# Patient Record
Sex: MALE | Race: WHITE | NOT HISPANIC OR LATINO | Employment: FULL TIME | URBAN - METROPOLITAN AREA
[De-identification: names, ages, dates, MRNs, and addresses within clinical notes are randomized per-mention and may not be internally consistent; named-entity substitution may affect disease eponyms.]

---

## 2017-10-31 ENCOUNTER — APPOINTMENT (OUTPATIENT)
Dept: URGENT CARE | Facility: CLINIC | Age: 27
End: 2017-10-31
Payer: COMMERCIAL

## 2017-10-31 ENCOUNTER — APPOINTMENT (OUTPATIENT)
Dept: RADIOLOGY | Facility: IMAGING CENTER | Age: 27
End: 2017-10-31
Attending: NURSE PRACTITIONER
Payer: COMMERCIAL

## 2017-10-31 ENCOUNTER — OCCUPATIONAL MEDICINE (OUTPATIENT)
Dept: URGENT CARE | Facility: CLINIC | Age: 27
End: 2017-10-31
Payer: COMMERCIAL

## 2017-10-31 VITALS
RESPIRATION RATE: 20 BRPM | SYSTOLIC BLOOD PRESSURE: 110 MMHG | HEART RATE: 73 BPM | OXYGEN SATURATION: 99 % | DIASTOLIC BLOOD PRESSURE: 80 MMHG | WEIGHT: 170 LBS | TEMPERATURE: 97.5 F

## 2017-10-31 DIAGNOSIS — S69.91XA HAND INJURY, RIGHT, INITIAL ENCOUNTER: ICD-10-CM

## 2017-10-31 DIAGNOSIS — S61.411A LACERATION OF RIGHT HAND WITHOUT FOREIGN BODY, INITIAL ENCOUNTER: ICD-10-CM

## 2017-10-31 PROCEDURE — 12041 INTMD RPR N-HF/GENIT 2.5CM/<: CPT | Performed by: NURSE PRACTITIONER

## 2017-10-31 PROCEDURE — 90715 TDAP VACCINE 7 YRS/> IM: CPT | Performed by: NURSE PRACTITIONER

## 2017-10-31 PROCEDURE — 90471 IMMUNIZATION ADMIN: CPT | Performed by: NURSE PRACTITIONER

## 2017-10-31 PROCEDURE — 73130 X-RAY EXAM OF HAND: CPT | Mod: TC,RT | Performed by: NURSE PRACTITIONER

## 2017-10-31 RX ORDER — CEPHALEXIN 500 MG/1
500 CAPSULE ORAL 4 TIMES DAILY
Qty: 20 CAP | Refills: 0 | Status: SHIPPED | OUTPATIENT
Start: 2017-10-31 | End: 2017-11-05

## 2017-10-31 ASSESSMENT — ENCOUNTER SYMPTOMS
TINGLING: 0
NECK PAIN: 0
FALLS: 0
MYALGIAS: 0
BACK PAIN: 0
ROS SKIN COMMENTS: LACERATION
NEUROLOGICAL NEGATIVE: 1
SENSORY CHANGE: 0
CONSTITUTIONAL NEGATIVE: 1
CARDIOVASCULAR NEGATIVE: 1
GASTROINTESTINAL NEGATIVE: 1
FOCAL WEAKNESS: 0
PSYCHIATRIC NEGATIVE: 1
RESPIRATORY NEGATIVE: 1

## 2017-10-31 NOTE — PROGRESS NOTES
Subjective:      Benny Obrien is a 27 y.o. male who presents with Laceration (WC RT hand)      HPI    DOI 10/31/17: Patient reports he was lifting a heavy couch, while at work, approx one hour ago when the couch dropped and his right hand hit a piece of metal, resulting in a laceration to his right hand. He came immediately to urgent care. He has not done anything for symptom relief. He denies numbness, tingling, or weakness. He is right hand dominate. He has injured this hand in the past but not in this particular area. He cannot recall his last tetanus booster. Denies other areas of injury. He denies other jobs or contributing factors.     History reviewed. No pertinent past medical history.  History reviewed. No pertinent surgical history.  No current outpatient prescriptions on file prior to visit.     No current facility-administered medications on file prior to visit.      Review of patient's allergies indicates no known allergies.    Review of Systems   Constitutional: Negative.    HENT: Negative.    Respiratory: Negative.    Cardiovascular: Negative.    Gastrointestinal: Negative.    Musculoskeletal: Positive for joint pain. Negative for back pain, falls, myalgias and neck pain.   Skin: Negative for itching and rash.        Laceration    Neurological: Negative.  Negative for tingling, sensory change and focal weakness.   Psychiatric/Behavioral: Negative.           Objective:     /80   Pulse 73   Temp 36.4 °C (97.5 °F)   Resp 20   Wt 77.1 kg (170 lb) Comment: per pt  SpO2 99%      Physical Exam   Constitutional: He is oriented to person, place, and time. Vital signs are normal. He appears well-developed and well-nourished. He is active. He does not appear ill. No distress.   HENT:   Head: Normocephalic and atraumatic.   Right Ear: External ear normal.   Left Ear: External ear normal.   Nose: Nose normal.   Mouth/Throat: Oropharynx is clear and moist.   Eyes: Conjunctivae are normal. Pupils  are equal, round, and reactive to light. Right eye exhibits no discharge. Left eye exhibits no discharge. No scleral icterus.   Neck: Normal range of motion. Neck supple.   Cardiovascular: Normal rate, regular rhythm, normal heart sounds and intact distal pulses.    Pulmonary/Chest: Effort normal and breath sounds normal. No respiratory distress.   Musculoskeletal: Normal range of motion. He exhibits tenderness. He exhibits no edema or deformity.        Right hand: He exhibits tenderness, bony tenderness and laceration. He exhibits normal range of motion, normal two-point discrimination, normal capillary refill, no deformity and no swelling. Normal sensation noted. Normal strength noted.        Hands:  There is a laceration and point tenderness to distal and dorsal aspect of his right third metacarpal. The laceration is approximately 1.5 cm, crescent shaped. No obvious foreign bodies identified. No tendon or ligament involvement, no bony exposure. He has full ROM. Distal N/V intact. Cap refill is brisk.     Lymphadenopathy:     He has no cervical adenopathy.   Neurological: He is alert and oriented to person, place, and time. He has normal strength. No cranial nerve deficit or sensory deficit. He exhibits normal muscle tone. Coordination and gait normal. GCS eye subscore is 4. GCS verbal subscore is 5. GCS motor subscore is 6.   Skin: Skin is warm. Capillary refill takes less than 2 seconds. Laceration noted. No abrasion, no bruising, no ecchymosis and no rash noted. He is not diaphoretic. No erythema. No pallor.   Psychiatric: He has a normal mood and affect. His behavior is normal. Judgment and thought content normal.   Vitals reviewed.         Assessment/Plan:     1. Laceration of right hand without foreign body, initial encounter  DX-HAND 3+ RIGHT    Tdap =>6yo IM    cephALEXin (KEFLEX) 500 MG Cap   2. Hand injury, right, initial encounter  DX-HAND 3+ RIGHT    cephALEXin (KEFLEX) 500 MG Cap       - DX-HAND 3+  "RIGHT reviewed by myself, radiology reading \"Unremarkable right hand series.\"      Procedure: Laceration Repair  -Risks including bleeding, nerve damage, infection, and poor cosmetic outcome discussed at length. Benefits and alternatives discussed.   -Sterile technique throughout  -Local anesthesia with 2% lidocaine  -Closed with #5  4-0 and 5-0 Nylon interrupted sutures with good wound approximation, full ROM and N/V intact post closure  -Polysporin and dressing placed  -Patient tolerated well        TDAP in clinic, laceration repair and dressing applied, Keflex, RICE, OTC motrin/tylenol for pain, RTC 11/2/17 for re-eval. Wound care discussed. Case reviewed with Dr. Michelle who evaluated the patient with me, agrees to place patient on prophylactic antibiotics.   Supportive care, differential diagnoses, and indications for immediate follow-up discussed with patient.   Pathogenesis of diagnosis discussed including typical length and natural progression.   Instructed to return to clinic or nearest emergency department sooner for any change in condition, further concerns, or worsening of symptoms.  Patient states understanding of the plan of care and discharge instructions.          JOSE Love.            "

## 2017-10-31 NOTE — LETTER
EMPLOYEE’S CLAIM FOR COMPENSATION/ REPORT OF INITIAL TREATMENT  FORM C-4    EMPLOYEE’S CLAIM - PROVIDE ALL INFORMATION REQUESTED   First Name  Benny Last Name  Camille Birthdate                    1990                Sex  male Claim Number   Home Address  [address] Age  27 y.o. Height   Weight  77.1 kg (170 lb) [facility]     [place] Zip  [address] Telephone  [phone] (home)    Mailing Address  [address] [place] Zip  [address] Primary Language Spoken  English    Insurer   Third Party   [employer]   Employee's Occupation (Job Title) When Injury or Occupational Disease Occurred      Employer's Name     Telephone      Employer Address    City    State    Zip      Date of Injury  10/31/2017               Hour of Injury  8:45 AM Date Employer Notified  10/31/2017 Last Day of Work after Injury or Occupational Disease  10/31/2017 Supervisor to Whom Injury Reported  Eduardo Macdonald   Address or Location of Accident (if applicable)  [[place]]   What were you doing at the time of accident? (if applicable)  Moving Cough    How did this injury or occupational disease occur? (Be specific an answer in detail. Use additional sheet if necessary)  I was carrying one side of couch. I ran my hand against a peice of metal   If you believe that you have an occupational disease, when did you first have knowledge of the disability and it relationship to your employment?  na Witnesses to the Accident  Josse Herrera      Nature of Injury or Occupational Disease  Laceration  Part(s) of Body Injured or Affected  Hand (R), N/A, N/A    I certify that the above is true and correct to the best of my knowledge and that I have provided this information in order to obtain the benefits of [place]  I  hereby authorize any physician, chiropractor, surgeon, practitioner, or other person, any hospital, including [facility] or [facility], any medical service organization, any insurance company, or other institution or organization to release to each other, any medical or other information, including benefits paid or payable, pertinent to this injury or disease, except information relative to diagnosis, treatment and/or counseling for AIDS, psychological conditions, alcohol or controlled substances, for which I must give specific authorization.  A Photostat of this authorization shall be as valid as the original.     Date   Place   Employee’s Signature   THIS REPORT MUST BE COMPLETED AND MAILED WITHIN 3 WORKING DAYS OF TREATMENT   Place  [facility]  Name of Facility  [facility]   Date  10/31/2017 Diagnosis  (S61.411A) Laceration of right hand without foreign body, initial encounter  (S69.91XA) Hand injury, right, initial encounter Is there evidence the injured employee was under the influence of alcohol and/or another controlled substance at the time of accident?   Hour  10:00 AM Description of Injury or Disease  Diagnoses of Laceration of right hand without foreign body, initial encounter and Hand injury, right, initial encounter were pertinent to this visit. No   Treatment  TDAP in clinic, laceration repair and dressing applied, Keflex, RICE, OTC motrin/tylenol for pain, RTC 11/2/17 for re-eval.   Have you advised the patient to remain off work five days or more? No   X-Ray Findings  Negative   If Yes   From Date  To Date      From information given by the employee, together with medical evidence, can you directly connect this injury or occupational disease as job incurred?  Yes If No Full Duty  No Modified Duty  Yes   Is additional medical care by a physician indicated?  Yes If Modified Duty, Specify any Limitations / Restrictions  Per D-39   Do you know of any previous injury  "or disease contributing to this condition or occupational disease?                            No   Date  10/31/2017 Print Doctor’s Name JODI Love I certify the employer’s copy of  this form was mailed on:   Address  [address] Insurer’s Use Only     [place] Zip  [address]    Provider’s Tax ID Number  [id number] Telephone  Dept: [phone]        e-RAJWINDER Leon   e-Signature: Dr. Vick Becerra, Medical Director Degree  APRN        ORIGINAL-TREATING PHYSICIAN OR CHIROPRACTOR    PAGE 2-INSURER/TPA    PAGE 3-EMPLOYER    PAGE 4-EMPLOYEE             Form C-4 (rev10/07)              BRIEF DESCRIPTION OF RIGHTS AND BENEFITS  (Pursuant to [place] 616C.050)    Notice of Injury or Occupational Disease (Incident Report Form C-1): If an injury or occupational disease (OD) arises out of and in the  course of employment, you must provide written notice to your employer as soon as practicable, but no later than 7 days after the accident or  OD. Your employer shall maintain a sufficient supply of the required forms.    Claim for Compensation (Form C-4): If medical treatment is sought, the form C-4 is available at the place of initial treatment. A completed  \"Claim for Compensation\" (Form C-4) must be filed within 90 days after an accident or OD. The treating physician or chiropractor must,  within 3 working days after treatment, complete and mail to the employer, the employer's insurer and third-party , the Claim for  Compensation.    Medical Treatment: If you require medical treatment for your on-the-job injury or OD, you may be required to select a physician or  chiropractor from a list provided by your workers’ compensation insurer, if it has contracted with an Organization for Managed Care (MCO) or  Preferred Provider Organization (PPO) or providers of health care. If your employer has not entered into a contract with an MCO or PPO, you  may select a physician or " chiropractor from the Panel of Physicians and Chiropractors. Any medical costs related to your industrial injury or  OD will be paid by your insurer.    Temporary Total Disability (TTD): If your doctor has certified that you are unable to work for a period of at least 5 consecutive days, or 5  cumulative days in a 20-day period, or places restrictions on you that your employer does not accommodate, you may be entitled to TTD  compensation.    Temporary Partial Disability (TPD): If the wage you receive upon reemployment is less than the compensation for TTD to which you are  entitled, the insurer may be required to pay you TPD compensation to make up the difference. TPD can only be paid for a maximum of 24  months.    Permanent Partial Disability (PPD): When your medical condition is stable and there is an indication of a PPD as a result of your injury or  OD, within 30 days, your insurer must arrange for an evaluation by a rating physician or chiropractor to determine the degree of your PPD. The  amount of your PPD award depends on the date of injury, the results of the PPD evaluation and your age and wage.    Permanent Total Disability (PTD): If you are medically certified by a treating physician or chiropractor as permanently and totally disabled  and have been granted a PTD status by your insurer, you are entitled to receive monthly benefits not to exceed 66 2/3% of your average  monthly wage. The amount of your PTD payments is subject to reduction if you previously received a PPD award.    Vocational Rehabilitation Services: You may be eligible for vocational rehabilitation services if you are unable to return to the job due to a  permanent physical impairment or permanent restrictions as a result of your injury or occupational disease.    Transportation and Per Frances Reimbursement: You may be eligible for travel expenses and per frances associated with medical treatment.    Reopening: You may be able to reopen your  claim if your condition worsens after claim closure.    Appeal Process: If you disagree with a written determination issued by the insurer or the insurer does not respond to your request, you may  appeal to the Department of Administration, , by following the instructions contained in your determination letter. You must  appeal the determination within 70 days from the date of the determination letter at [address], [address], [place], [place]  [address], or [address], [place], [place] [address]. If you disagree with the  decision, you may appeal to the  Department of Administration, . You must file your appeal within 30 days from the date of the  decision  letter at [address], [address], [place], [place] [address], or [address], [place], [place] [address]. If you  disagree with a decision of an , you may file a petition for judicial review with the District Court. You must do so within 30  days of the Appeal Officer’s decision. You may be represented by an  at your own expense or you may contact the [facility] for possible  representation.    [place]  for Injured Workers (NAIW): If you disagree with a  decision, you may request that NAIW represent you  without charge at an  Hearing. For information regarding denial of benefits, you may contact the [facility] at: [address], [address], [place] [address], [phone]) [phone], or [address], [place] [address], [phone]) [phone]    To File a Complaint with the Division: If you wish to file a complaint with the  of the Division of Industrial Relations (DIR),  please contact the Workers’ Compensation Section, [address], [address], [place], [place] [address], telephone ([phone]) [phone], or  [address], [place]  [address], telephone [phone]) [phone].    For assistance with Workers’ Compensation Issues: you may contact the Office of the Governor Consumer Health Assistance, [address] [place] [address], [address], [place] [address], Toll Free 1-583.820.2016, Web site: http://[employer].[facility].nv., E-mail  [email or web address].                                                                                                                                                                                                                                   __________________________________________________________________                                                                   _________________                Employee Name / Signature                                                                                                                                                       Date                                                                                                                                                                                                     D-2 (rev. 10/07)

## 2017-10-31 NOTE — LETTER
"   [facility]  [address] [place] [address]  Phone:  [phone] - Fax:  [phone]   [facility] Progress Report and Disability Certification  Date of Service: 10/31/2017   No Show:  No  Date / Time of Next Visit: 11/2/2017   Claim Information   Patient Name: Benny Obrien  Claim Number:     Employer:   Date of Injury: 10/31/2017     Insurer / TPA: Donna  ID / SSN:     Occupation:   Diagnosis: Diagnoses of Laceration of right hand without foreign body, initial encounter and Hand injury, right, initial encounter were pertinent to this visit.    Medical Information   Related to Industrial Injury? Yes    Subjective Complaints:  DOI 10/31/17: Patient reports he was lifting a heavy couch, while at work, approx one hour ago when the couch dropped and his right hand hit a piece of metal, resulting in a laceration to his right hand. He came immediately to urgent care. He has not done anything for symptom relief. He denies numbness, tingling, or weakness. He is right hand dominate. He has injured this hand in the past but not in this particular area. He cannot recall his last tetanus booster. Denies other areas of injury. He denies other jobs or contributing factors.    Objective Findings: A/Ox4. NAD. There is a laceration and point tenderness to distal and dorsal aspect of his right third metacarpal. The laceration is approximately 1.5 cm, crescent shaped. No obvious foreign bodies identified. No tendon or ligament involvement, no bony exposure. He has full ROM. Distal N/V intact. Cap refill is brisk.     Pre-Existing Condition(s):    Assessment:   Initial Visit    Status: Additional Care Required  Permanent Disability:No    Plan: Medication  Comments:TDAP in clinic, laceration repair and dressing applied, Keflex, RICE, OTC motrin/tylenol for pain, RTC 11/2/17 for re-eval.     Diagnostics: X-ray  Comments:DX right hand radiology reading \"Unremarkable " "right hand series\"    Comments:       Disability Information   Status: Released to Restricted Duty    From:  10/31/2017  Through: 2017 Restrictions are: Temporary   Physical Restrictions   Sitting:    Standing:    Stooping:    Bending:      Squatting:    Walking:    Climbing:    Pushing:  < or = to 1 hr/day  Comments:right hand   Pulling:  < or = to 1 hr/day  Comments:right hand Other:    Reaching Above Shoulder (L):   Reaching Above Shoulder (R):       Reaching Below Shoulder (L):    Reaching Below Shoulder (R):      Not to exceed Weight Limits   Carrying(hrs): 0  Comments:right hand Weight Limit(lb):   Lifting(hrs): 0  Comments:right hand Weight  Limit(lb):     Comments: Patient has been instructed to keep hand clean and dry, with dressing in place, while at work. Not to submerge into water or liquids. Consulted with Dr. Michelle regarding patient, he advised to place patient on Keflex.     Repetitive Actions   Hands: i.e. Fine Manipulations from [place] hrs/day  Comments:right hand   Feet: i.e. Operating Foot Controls:     Driving / Operate Machinery:     Physician Name: JODI Love Physician Signature: RAJWINDER Chacon e-Signature: Dr. Vick Becerra, Medical Director   Clinic Name / Location: [facility]  [address] [place] [address] Clinic Phone Number: Dept: [phone]   Appointment Time: 10:00 Am Visit Start Time: 10:00 AM   Check-In Time:  9:57 Am Visit Discharge Time:     Original-Treating Physician or Chiropractor    Page 2-Insurer/TPA    Page 3-Employer    Page 4-Employee    "

## 2017-10-31 NOTE — LETTER
[facility]  DOCTOR'S FIRST REPORT OF OCCUPATIONAL INJURY OR ILLNESS  Within 5 days of your initial examination, for every occupational injury or illness, send two copies of this report to the employer's workers' compensation insurance carrier or the insured employer. Failure to file a timely doctor's report may result in assessment of a civil penalty. In the case of diagnosed or suspected pesticide poisoning, send a copy of the report to Division of Labor Statistics and Research, [address], [place] [address], and notify your local health officer by telephone within 24 hours.     1. INSURER NAME AND ADDRESS  Esis PLEASE DO NOT USE THIS COLUMN     2. EMPLOYER NAME         Case No.     3. Address          No. And [facility]   [employer]     4. Nature of Business (e.g., food, manufacturing, building construction, retailer of women's clothes.)       [facility] [employer] [facility] [employer]     5. PATIENT NAME (first name, middle initial, last name)      Tushar Zapata Swenson   6. Sex       male 7. Date of Birth     Mo. Day   Yr        1990                                Age  27 y.o.     8.  Address No. And [facility] [address]   [facility]  [address] 9. Telephone Number  [phone] (home)  [employer]     10. Occupation  (Specify job title)      11. Social Security Number     Disease     12. Injured at:        No. And [employer]           [Storage Unit] [facility]     13. Date and hour of Injury or onset of illness              Mo. Day  Yr.   10/31/2017 Hour  8:45 AM  14.  Date last worked                      Mo. Day   Yr.                    10/31/2017 Occupation     15. Date and hour of first examination or treatment Mo/Day/Yr: 10/31/2017Hour: 10:42 AM 16. Have you (or your office) previously treated patient? No Return Date/Code    Patient please complete this portion, if able to do so. Otherwise, doctor please complete immediately, inability or  "failure of a patient to complete this portion shall not affect  his/her rights to workers' compensation under the [place] Labor code.   17. DESCRIBE HOW THE ACCIDENT OR EXPOSURE HAPPENED.  (Give specific object, machinery or chemical. Use reverse side if more space is required)    Moving Cough      18. SUBJECTIVE COMPLAINTS (Describe fully. Use reverse side if more space is required)   DOI 10/31/17: Patient reports he was lifting a heavy couch, while at work, approx one hour ago when the couch dropped and his right hand hit a piece of metal, resulting in a laceration to his right hand. He came immediately to urgent care. He has not done anything for symptom relief. He denies numbness, tingling, or weakness. He is right hand dominate. He has injured this hand in the past but not in this particular area. He cannot recall his last tetanus booster. Denies other areas of injury. He denies othe        19. OBJECTIVE FINDINGS (Use reverse side if more space is required)                 A. Physical examination - Laceration   A/Ox4. NAD. There is a laceration and point tenderness to distal and dorsal aspect of his right third metacarpal. The laceration is approximately 1.5 cm, crescent shaped. No obvious foreign bodies identified. No tendon or ligament involvement, no bony exposure. He has full ROM. Distal N/V intact. Cap refill is brisk.                   B. X-ray and laboratory results (state if non or pending)   DX right hand negative for fracture     20. DIAGNOSIS (if occupational illness specify etiologic agent and duration of exposure.  (See pg. 2)          Chemical or  toxic compounds involved?   No    Laceration of right hand without foreign body, initial encounter  Hand injury, right, initial encounter [id number]  [id number] Primary Diagnosis:           21. Are your findings and diagnosis consistent with patient's account of injury or onset of illness?   Yes     If  \"No\", please explain:        22. Is there any other " "current condition that will impede or delay patient's recovery? No     If  \"Yes\", please explain         23. TREATMENT RENDERED:    TDAP in clinic, laceration repair and dressing applied, Keflex, RICE, OTC motrin/tylenol for pain, RTC 11/2/17 for re-eval.      24. If further treatment required, specify treatment plan/estimated duration.   Yes, TDAP in clinic, laceration repair and dressing applied, Keflex, RICE, OTC motrin/tylenol for pain, RTC 11/2/17 for re-eval. .  From: 10/31/2017  To: 11/2/2017       25. If hospitalized as inpatient, give hospital name and location:      Date Admitted   Mo.     Day     Yr.         Estimated Stay:      days,     26. WORK STATUS - Is patient able to perform usual work?  No          Specific Restrictions:   See Restrictions on Page 2   If \"No\", date when patient can return to:  Regular Work:   Modified Work:    10/31/2017          State   Type   License Number    Doctor's Signature  ghislaine-RAJWINDER [email or web address]     License: [facility] [id number]    Doctor Name and Degree    JODI Love  IRS Number   [id number]    Address:  [address] [place] [address],   [id number]  [address] [place] [id number]  Telephone Number:  Dept: [phone]       FORM 5055 (Rev.4) 1992   Any person who makes or causes to be made any knowingly false or fraudulent material statement or material representation for the purpose of obtaining or denying workers' compensation benefits or payments is guilty of a felony           Continuation of Data from First Page      18. Subjective Complaints (From first page)           DOI 10/31/17: Patient reports he was lifting a heavy couch, while at work, approx one hour ago when the couch dropped and his right hand hit a piece of metal, resulting in a laceration to his right hand. He came immediately to urgent care. He has not done anything for symptom relief. He denies numbness, tingling, or weakness. He is right hand " dominate. He has injured this hand in the past but not in this particular area. He cannot recall his last tetanus booster. Denies other areas of injury. He denies othe      19. Objective Findings (From first page)           A/Ox4. NAD. There is a laceration and point tenderness to distal and dorsal aspect of his right third metacarpal. The laceration is approximately 1.5 cm, crescent shaped. No obvious foreign bodies identified. No tendon or ligament involvement, no bony exposure. He has full ROM. Distal N/V intact. Cap refill is brisk.        20. If occupational illness specify etiologic agent and duration of exposure.              21. Are your findings and diagnosis consistent with patient's account of injury or onset of illness?                       23. Treatment Rendered (From first page)           TDAP in clinic, laceration repair and dressing applied, Keflex, RICE, OTC motrin/tylenol for pain, RTC 11/2/17 for re-eval.          26. Restrictions   Specific Restrictions:    Bending:     Climbing:     Other:     Pulling:  < or = to 1 hr/day  Comments:right hand  Pushing:  < or = to 1 hr/day  Comments:right hand  Reaching Above   or Below Shoulders [unfilled]  Sitting:     Standing:     Stooping:     Walking:       Carrying Time:  0  Comments:right hand Hour(s)   Carrying Weight:      Lifting Time: 0  Comments:right hand Hour(s)  Lifting Weight:      Additional Restrictions: Patient has been instructed to keep hand clean and dry, with dressing in place, while at work. Not to submerge into water or liquids. Consulted with Dr. Michelle regarding patient, he advised to place patient on Keflex.

## 2017-10-31 NOTE — LETTER
Attending Physician's Report U.[place] Department of Labor  Office of Worker's Compensation Programs                                                                                   Record of Examination     1. Patients Name: Last [facility]   2. Date of Injury   3. [facility] File Number   OMB No. [id number]    Camille Zapata 10/31/2017    Expires 10-     4. What history of injury (including disease) did patient give you? Moving Cough     I was carrying one side of couch. I ran my hand against a peice of metal DOI 10/31/17: Patient reports he was lifting a heavy couch, while at work, approx one hour ago when the couch dropped and his right hand hit a piece of metal, resulting in a laceration to his right hand. He came immediately to urgent care. He has not   done anything for symptom relief. He denies numbness, tingling, or weakness. He is right hand dominate. He has injured this hand in the past but not in this particular area. He cannot recall his last tetanus booster. Denies other areas of injury. He   denies other jobs or contributing factors.      5. Is there any history or evidence of concurrent or pre-existing injury or disease or physical impairment? (If yes, please describe)    No:     ICD-9 Code:         6. What are your findings? (Include results of X-Rays, laboratory reports., etc)  Laceration    A/Ox4. NAD. There is a laceration and point tenderness to distal and dorsal aspect of his right third metacarpal. The laceration is approximately 1.5 cm, crescent shaped. No obvious foreign bodies identified. No tendon or ligament involvement, no bony exposure. He has full ROM. Distal N/V intact. Cap refill is brisk.       7. What is your diagnosis? Laceration Of Right Hand Without Foreign Body, Initial Encounter  Hand Injury, Right, Initial Encounter ICD-9 Code:  [id number]  [id number]     8. Do you believe the condition found was caused or aggravated by an employment activity? (Please explain answer)   Yes   "    This injury was directly related to work duty.      9. Did the injury require hospitalization?    If \"No\", go to item 13    No      10. Date of admission      11. Date of discharge      12. Additional hospitalization required?     If \"Yes\", describe in \"Remarks (item 25) No      13. What treatment did you provide?     TDAP in clinic, laceration repair and dressing applied, Keflex , RICE, OTC motrin/tylenol for pain, RTC 11/2/17 for re-eval.      14. Date of First Examination      10/31/2017   15. Date(s) of treatment  10/31/2017   15. Date(s) of treatment 2    15. Date(s) of treatment 3      16. Date of discharge from treatment        17. Period of Total Disability           From:                  Thru:            18. Period of Partial Disability           From:                Thru:          19. Date employee able to resume light work      10/31/2017       20. Date employee is able to resume regular work      21 Has employee been advised that he/she can return to work?    Yes     22. If \"Yes\", on what date was he/she advised?  10/31/2017     23. If employee is able to resume only light work, indicate the extent of physical limitations and the type of work that could     be reasonably be performed with these limitations. (See Page 2 for additional Restrictions)         24. Are any permanent effects expected as a result of this injury?     No            If \"Yes\" describe on Page 2      25. Remarks   Question 12:        Question 24:        Question 25:       26. If you have referred the employee to another physician provide the following:        Name:             Specialty:       Address:                         City:   ,   State:     Zip:      27. What was the reason for this referral?  Consultation     28. I certify that the statements in response to the questions asked above are true, complete and correct to the best of my knowledge. Further I understand that any false or misleading statements or any " misrepresentations or concealment of material fact which is knowingly made may subject me to felony criminal prosecution.         Signature of physician                                                                                                                                                                  Date        10/31/2017                                                                                      29. Name of Physician  JOID Love   30 Tax ID Number [id number]      Address: [address] [place] [address],   [id number] 31. Do you specialize? Yes  32. If yes, indicate specialty:  Family Medicine   Form CA-20     Rev. Nov 1999         Physical Restrictions    Sitting:    Standing:    Stooping:    Bending:      Squatting:    Walking:    Climbing:    Pushing:  < or = to 1 hr/day  Comments:right hand   Pulling:  < or = to 1 hr/day  Comments:right hand Other:    Reaching Above / Below Shoulders:  [unfilled]   Carrying Time (HR): 0  Comments:right hand Weight Limit (LBS)   Lifting Time (HR) 0  Comments:right hand   Weight Limit (LBS)       Additional Restrictions   Patient has been instructed to keep hand clean and dry, with dressing in place, while at work. Not to submerge into water or liquids. Consulted with Dr. Michelle regarding patient, he advised to place patient on Keflex.      A-20 PAGE 2      INSTRUCTIONS TO PHYSICIAN FOR COMPLETING ATTENDING PHYSICIAN'S REPORT    1. COMPLETE THE ENTRIES 1-32 ON THE FORM; AND  2. IF DISABILITY HAS NOT TERMINATED, INDICATE IN ITEM 17; AND  3. SEND THE FORM AND YOUR BILL TO:        OFFICE OF WORKERS' COMPENSATION PROGRAMS  [facility]  [address]  [place] [address]      IMPORTANT:  A MEDICAL REPORT IS REOUIRED BY THE OFFICE OF WORKERS' COMPENSATION     PROGRAMS BEFORE PAYMENT OF COMPENSATION FOR LOSS OF WAGES OR     PERMANENT DISABILITY CAN BE MADE TO THE EMPLOYEE. THIS INFORMATION IS    REQUIRED TO OBTAIN OR RETAIN A  BENEFIT (5 U.S.C. 8101, et seq.).      IF YOU HAVE SUBMITTED A NARRATIVE MEDICAL REPORT OR A FORM CA-16    TO [facility] WITHIN THE PAST 10 DAYS, YOU NEED NOT SUBMIT THIS FORM CA-20.      [facility] REQUIRES THAT MEDICAL BILLS, OTHER THAN HOSPITAL BILLS, BE SUBMITTED    ON THE [employer] HEALTH INSURANCE CLAIM FORM,    FA 1500/OWCP-1500.      INSTRUCTIONS FOR THE INJURED WORKER/ EMPLOYING AGENCY    Compensation for wage loss cannot be paid unless medical evidence has been submitted supporting disability for work during the period claimed. For claims based on traumatic injury and reported on Form CA-1 , the employee should detach Form CA-20 and complete items 1-3 on the front. The form should be promptly referred to the attending physician for  early completion. If the claim is for occupational disease, filed on Form CA-2 , a medical report as described in the instructions accompanying that form is required in most cases. The employee should bring these requirements to the physician's attention. It may be necessary for the physician to provide a narrative medical report in place of or in addition to Form CA-20 to adequately explain and support the relationship of the disability to the employment.      For payment of a schedule award, the claimant must have a permanent loss or loss of function of one of the members of  the body or organs enumerated in the regulations (20 C.F.R. 10.404). The attending physician must affirm that maximum medical improvement of the condition has been reached and should describe the functional loss and the resulting  impairment in accordance with the American Medical Association Guides to the Evaluation of Permanent Impairment.      NOTICE    If you have a substantially limiting physical or mental impairment, Federal disability nondiscrimination law gives you the right to receive help from [facility] in the form of communication assistance, accommodation and modification to aid you in  the FECA claims process. For example, we will provide you with copies of documents in alternate formats,  communication services such as sign language interpretation, or other kinds of adjustments or changes to account for the limitations of your disability. Please contact our office or the  to ask about this assistance.        CA-20 PAGE 3              Privacy Act Statement    In accordance with the Privacy Act of 1974, as amended (5 U.S.C.552a), you are here by notified that: (1) The Federal Employees' Compensation Act, as amended and extended (5 U.S. C. 8101, et seq.) (FECA) is  administered by the 0ffice of Workers' Compensation Programs of the U. S. Department of Labor, which receives and maintains persona( information on claimants and their immediate families. (2) Information which the 0ffice has will be used to determine eligibility for and the amount of benefits payab(e under the FECA, and may be verified through computer matches or other appropriate means. (3) Information may be given to the Federal agency which emp(oyed the claimant at the time of injury in order to verify statements made, answer questions concerning the status of the claim, verify billing, and to consider issues relating to retention, rehire, or other relevant matters. (4) Information may also be given to other Federal agencies, other government entities, and to private-sector agencies and/or employers as part of rehabilitative and other usutlo-je-qpcs programs and services. (5) Information may be disclosed to physicians and other healthcare providers for use in providing treatment or medical/vocational rehabilitation, making evaluations for the 0ffice, and for other purposes related to the medical management of the claim. (6) Information may be given to Federal, state and local agencies for law enforcement purposes, to obtain information relevant to a decision under the FECA, to determine whether benefits are being paid  proper(y, including whether prohibited dual payments are being made, and, where appropriate, to pursue salary/administrative offset and debt collection actions required or permitted by the FECA and/or the Debt Collection Act. (7) Disclosure of the claimant's social security number (SSN) or tax identifying number (TIN) on this form is mandatory. The SSN and/or TIN, and other information maintained by the [facility], may be used for identification, to support debt collection efforts carried on by the Federal government, and for other purposes required or authorized by law. (8) Failure to disclose all requested information may delay the processing of the claim or the payment of benefits, or may result in an unfavorable decision or reduced level of benefits.    Public [place] Statement    According to the Paperwork Reduction Act of 1995, no persons are required to respond to this collection of information unless it displays a currently valid 0MB control number. Public reporting burden for this collection of information is estimated to average 5 minutes per response, including time for reviewing instructions, searching existing data sources, gathering and maintaining the data needed, and completing and reviewing the collection of information. The obligation to respond to this collection is required to obtain or retain a benefit under 5 U.S. C. 8101, et seq. Send comments regarding the burden estimate or any other aspect of this collection of information, including suggestions for reducing this burden, to the U.S. Department of Labor, [facility] of Workers' Compensation Programs, Room , [address] [place] [address], [facility] [address], and reference the 0MB Control Number 0480-7308. Note: Please do not send the completed form to this office.                                    A-20 PAGE 4

## 2017-11-02 ENCOUNTER — OCCUPATIONAL MEDICINE (OUTPATIENT)
Dept: URGENT CARE | Facility: CLINIC | Age: 27
End: 2017-11-02
Payer: COMMERCIAL

## 2017-11-02 VITALS
TEMPERATURE: 99 F | HEART RATE: 78 BPM | WEIGHT: 175 LBS | DIASTOLIC BLOOD PRESSURE: 70 MMHG | SYSTOLIC BLOOD PRESSURE: 110 MMHG | RESPIRATION RATE: 16 BRPM | BODY MASS INDEX: 24.5 KG/M2 | HEIGHT: 71 IN | OXYGEN SATURATION: 98 %

## 2017-11-02 DIAGNOSIS — S61.411D LACERATION OF RIGHT HAND WITHOUT FOREIGN BODY, SUBSEQUENT ENCOUNTER: ICD-10-CM

## 2017-11-02 PROCEDURE — 99213 OFFICE O/P EST LOW 20 MIN: CPT | Performed by: PHYSICIAN ASSISTANT

## 2017-11-02 NOTE — PROGRESS NOTES
"Subjective:      Benny Obrien is a 27 y.o. male who presents with Follow-Up ( follow up  for wound check)      DOI: 10/31/17  Pt notes had laceration to dorsum of right hand on DOI. Repaired in clinic, c/o pain w/ AROM to hand but able. Denies fever/chills/redness/discharge or numbness to hand since. Reports healing well over last two days. Denies other current employment. Denies PMH of similar injury. Pt is RHD, works as , right hand is the hand he uses w/ cutting /knife.      HPI    ROS       Objective:     /70   Pulse 78   Temp 37.2 °C (99 °F)   Resp 16   Ht 1.803 m (5' 11\")   Wt 79.4 kg (175 lb)   SpO2 98%   BMI 24.41 kg/m²      Physical Exam    Gen: AOx3; Head: NC AT; Eyes: PERRLA/EOM; Lungs: NLR; Cardiac: RR by periph pulse exam; Right hand: well healing laceration to dorsum of hand at base of 3rd digit, no erythema to lac, no extension of erythema, pain w/ AROM 3rd MCPJ, PROM increases, able to resist w/ flexion but minimal resistance w/ extension through joint, he states he is able but it hurts. Neuro: NVID to digital tip all digits, brisk cap refill, +2 rab bilat       Assessment/Plan:     1. Laceration of right hand without foreign body, subsequent encounter  restricted duty w/ 10# weight restriction, f/u on 11/9 for likely suture removal and MMI, f/u sooner w/ decreased use of digit or increase in pain/redness, finish keflex, OTC nsaids       "

## 2017-11-02 NOTE — LETTER
[facility]  [address] [place] [address]  Phone:  [phone] - Fax:  [phone]   [facility] Progress Report and Disability Certification  Date of Service: 11/2/2017   No Show:  No  Date / Time of Next Visit: 11/9/2017 @11AM   Claim Information   Patient Name: Benny Obrien  Claim Number:     Employer:   [employer] [place] Date of Injury: 10/31/2017     Insurer / TPA: [employer]  ID / SSN:     Occupation:   Diagnosis: The encounter diagnosis was Laceration of right hand without foreign body, subsequent encounter.    Medical Information   Related to Industrial Injury? Yes    Subjective Complaints:  DOI: 10/31/17  Pt notes had laceration to dorsum of right hand on DOI. Repaired in clinic, c/o pain w/ AROM to hand but able. Denies fever/chills/redness/discharge or numbness to hand since. Reports healing well over last two days. Denies other current employment. Denies PMH of similar injury. Pt is RHD, works as , right hand is the hand he uses w/ cutting /knife.    Objective Findings: Gen: AOx3; Head: NC AT; Eyes: PERRLA/EOM; Lungs: NLR; Cardiac: RR by periph pulse exam; Right hand: well healing laceration to dorsum of hand at base of 3rd digit, no erythema to lac, no extension of erythema, pain w/ AROM 3rd MCPJ, PROM increases, able to resist w/ flexion but minimal resistance w/ extension through joint, he states he is able but it hurts. Neuro: NVID to digital tip all digits, brisk cap refill, +2 rab bilat   Pre-Existing Condition(s):     Assessment:   Condition Same    Status: Additional Care Required  Permanent Disability:No    Plan:   Comments:restricted duty w/ 10# weight restriction, f/u on 11/9 for likely suture removal and MMI, f/u sooner w/ decreased use of digit or increase in pain/redness, finish keflex, OTC nsaids      Diagnostics:      Comments:  restricted duty w/ 10# weight restriction, f/u on 11/9 for likely suture removal and  MMI, f/u sooner w/ decreased use of digit or increase in pain/redness, finish keflex, OTC nsaids     Disability Information   Status: Released to Restricted Duty    From:  11/2/2017  Through: 11/9/2017 Restrictions are: Temporary   Physical Restrictions   Sitting:    Standing:    Stooping:    Bending:      Squatting:    Walking:    Climbing:    Pushing:      Pulling:    Other:    Reaching Above Shoulder (L):   Reaching Above Shoulder (R):       Reaching Below Shoulder (L):    Reaching Below Shoulder (R):      Not to exceed Weight Limits   Carrying(hrs):   Weight Limit(lb):   Lifting(hrs):   Weight  Limit(lb):     Comments: restricted duty w/ 10# weight restriction, f/u on 11/9 for likely suture removal and MMI, f/u sooner w/ decreased use of digit or increase in pain/redness, finish keflex, OTC nsaids     Repetitive Actions   Hands: i.e. Fine Manipulations from Grasping:     Feet: i.e. Operating Foot Controls:     Driving / Operate Machinery:     Physician Name: Janes Ambrosio P.A.-C. Physician Signature: JANES Vanegas P.A.-C. e-Signature: Dr. Vick Becerra, Medical Director   Clinic Name / Location: [address] [address] Clinic Phone Number: Dept: [phone]   Appointment Time: 11:00 Am Visit Start Time: 11:20 AM   Check-In Time:  11:05 Am Visit Discharge Time:  11:48 AM   Original-Treating Physician or Chiropractor    Page 2-Insurer/TPA    Page 3-Employer    Page 4-Employee

## 2017-11-09 ENCOUNTER — OCCUPATIONAL MEDICINE (OUTPATIENT)
Dept: URGENT CARE | Facility: CLINIC | Age: 27
End: 2017-11-09
Payer: COMMERCIAL

## 2017-11-09 VITALS
HEART RATE: 83 BPM | BODY MASS INDEX: 24.36 KG/M2 | SYSTOLIC BLOOD PRESSURE: 102 MMHG | DIASTOLIC BLOOD PRESSURE: 76 MMHG | WEIGHT: 174 LBS | OXYGEN SATURATION: 97 % | TEMPERATURE: 99.1 F | HEIGHT: 71 IN

## 2017-11-09 DIAGNOSIS — Z48.02 VISIT FOR SUTURE REMOVAL: ICD-10-CM

## 2017-11-09 DIAGNOSIS — S61.411S: ICD-10-CM

## 2017-11-09 PROCEDURE — 99213 OFFICE O/P EST LOW 20 MIN: CPT | Performed by: NURSE PRACTITIONER

## 2017-11-09 ASSESSMENT — ENCOUNTER SYMPTOMS
SHORTNESS OF BREATH: 0
COUGH: 0
FOCAL WEAKNESS: 1
CHILLS: 0
DIARRHEA: 0
SORE THROAT: 0
FEVER: 0
MYALGIAS: 0
DIZZINESS: 0
PALPITATIONS: 0
HEADACHES: 0
VOMITING: 0
NAUSEA: 0

## 2017-11-09 NOTE — PROGRESS NOTES
"Subjective:      Benny Obrien is a 27 y.o. male who presents with Suture / Staple Removal (WC Fv )    Chief Complaint   Patient presents with   • Suture / Staple Removal     WC Fv        Presents for suture removal. He continues to have some weakness to the area. He is unable to extend fully and feels the finger is weaker than the others  He denies any drainage, fever, chills, or any other consultation with the wound     HPI    Review of Systems   Constitutional: Negative for chills, fever and malaise/fatigue.   HENT: Negative for congestion and sore throat.    Respiratory: Negative for cough and shortness of breath.    Cardiovascular: Negative for chest pain and palpitations.   Gastrointestinal: Negative for diarrhea, nausea and vomiting.   Genitourinary: Negative for dysuria.   Musculoskeletal: Negative for myalgias.   Skin: Negative for rash.   Neurological: Positive for focal weakness. Negative for dizziness and headaches.          Objective:     /76   Pulse 83   Temp 37.3 °C (99.1 °F)   Ht 1.803 m (5' 11\")   Wt 78.9 kg (174 lb)   SpO2 97%   BMI 24.27 kg/m²      Physical Exam   Constitutional: He is oriented to person, place, and time. He appears well-developed and well-nourished. No distress.   HENT:   Head: Normocephalic and atraumatic.   Right Ear: External ear normal.   Left Ear: External ear normal.   Eyes: Conjunctivae are normal. Pupils are equal, round, and reactive to light.   Neck: Normal range of motion. Neck supple.   Cardiovascular: Normal rate.    Pulmonary/Chest: Effort normal. No respiratory distress. He has no wheezes.   Musculoskeletal:        Right hand: He exhibits decreased range of motion, tenderness and laceration. He exhibits no bony tenderness, normal two-point discrimination and no swelling.        Hands:  Lymphadenopathy:     He has no cervical adenopathy.   Neurological: He is alert and oriented to person, place, and time.   Skin: Skin is warm and dry. "   Psychiatric: He has a normal mood and affect. His behavior is normal. Judgment and thought content normal.   Nursing note and vitals reviewed.      There are 5 sutures over his third distal metacarpal joint. The wound is well approximated. There is some drift noted to his third finger when attempting to extend his hand. He does have good sensation. His  is slightly weakened       Assessment/Plan:   1. Laceration of right hand, sequela       2. Visit for suture removal     Recheck in 1-2 weeks for resolution

## 2017-11-09 NOTE — LETTER
[facility]  [address] [facility] [address] [place] [address]  Phone:  [phone] - Fax:  [phone]   [facility] Progress Report and Disability Certification  Date of Service: 11/9/2017   No Show:  No  Date / Time of Next Visit: 11/17/2017 11:00AM   Claim Information   Patient Name: Benny Obrien  Claim Number:     Employer:   [employer] Date of Injury: 10/31/2017     Insurer / TPA: [employer]  ID / SSN:     Occupation:   Diagnosis: Diagnoses of Laceration of right hand, sequela and Visit for suture removal were pertinent to this visit.    Medical Information   Related to Industrial Injury?      Subjective Complaints:  Presents for suture removal. He continues to have some weakness to the area. He is unable to extend fully and feels the finger is weaker than the others  He denies any drainage, fever, chills, or any other consultation with the wound   Objective Findings: There are 5 sutures over his third distal metacarpal joint. The wound is well approximated. There is some drift noted to his third finger when attempting to extend his hand. He does have good sensation. His  is slightly weakened   Pre-Existing Condition(s):     Assessment:   Condition Improved    Status: Additional Care Required  Permanent Disability:     Plan: Medication    Diagnostics:      Comments:       Disability Information   Status: Released to Full Duty    From:  11/9/2017  Through: 11/24/2017 Restrictions are:     Physical Restrictions   Sitting:    Standing:    Stooping:    Bending:      Squatting:    Walking:    Climbing:    Pushing:      Pulling:    Other:    Reaching Above Shoulder (L):   Reaching Above Shoulder (R):       Reaching Below Shoulder (L):    Reaching Below Shoulder (R):      Not to exceed Weight Limits   Carrying(hrs):   Weight Limit(lb):   Lifting(hrs):   Weight  Limit(lb):     Comments: We will remove the sutures and released to full duty. He will see the if  the sutures are the source of his limited extension. He will return in 1-2 weeks for recheck.    Repetitive Actions   Hands: i.e. Fine Manipulations from Grasping:     Feet: i.e. Operating Foot Controls:     Driving / Operate Machinery:     Physician Name: ARIANNA Pereira Physician Signature: KATINA Berger e-Signature: Dr. Vick Becerra, Medical Director   Clinic Name / Location: [facility]  [address] [place] [address] Clinic Phone Number: Dept: [phone]   Appointment Time: 11:00 Am Visit Start Time: 11:10 AM   Check-In Time:  11:03 Am Visit Discharge Time: 11:32 Am    Original-Treating Physician or Chiropractor    Page 2-Insurer/TPA    Page 3-Employer    Page 4-Employee